# Patient Record
Sex: FEMALE
[De-identification: names, ages, dates, MRNs, and addresses within clinical notes are randomized per-mention and may not be internally consistent; named-entity substitution may affect disease eponyms.]

---

## 2020-02-21 PROBLEM — Z00.00 ENCOUNTER FOR PREVENTIVE HEALTH EXAMINATION: Status: ACTIVE | Noted: 2020-02-21

## 2020-02-24 ENCOUNTER — APPOINTMENT (OUTPATIENT)
Dept: GASTROENTEROLOGY | Facility: CLINIC | Age: 25
End: 2020-02-24
Payer: COMMERCIAL

## 2020-02-24 VITALS
SYSTOLIC BLOOD PRESSURE: 112 MMHG | HEART RATE: 110 BPM | OXYGEN SATURATION: 98 % | DIASTOLIC BLOOD PRESSURE: 68 MMHG | BODY MASS INDEX: 30.3 KG/M2 | WEIGHT: 171 LBS | HEIGHT: 63 IN

## 2020-02-24 DIAGNOSIS — R10.12 LEFT UPPER QUADRANT PAIN: ICD-10-CM

## 2020-02-24 PROCEDURE — 99204 OFFICE O/P NEW MOD 45 MIN: CPT

## 2020-02-24 RX ORDER — ALBUTEROL SULFATE 90 UG/1
108 (90 BASE) AEROSOL, METERED RESPIRATORY (INHALATION)
Refills: 0 | Status: ACTIVE | COMMUNITY
Start: 2020-02-24

## 2020-02-24 RX ORDER — OMEPRAZOLE 20 MG/1
20 CAPSULE, DELAYED RELEASE ORAL DAILY
Qty: 90 | Refills: 1 | Status: ACTIVE | COMMUNITY
Start: 2020-02-24 | End: 1900-01-01

## 2020-02-24 RX ORDER — LEVOTHYROXINE SODIUM 0.03 MG/1
25 TABLET ORAL
Qty: 30 | Refills: 1 | Status: ACTIVE | COMMUNITY
Start: 2020-02-24

## 2020-02-24 RX ORDER — NORETHINDRONE ACETATE AND ETHINYL ESTRADIOL 1; .02 MG/1; MG/1
1-20 TABLET ORAL
Refills: 0 | Status: ACTIVE | COMMUNITY
Start: 2020-02-24

## 2020-02-24 NOTE — PHYSICAL EXAM
[General Appearance - Alert] : alert [General Appearance - In No Acute Distress] : in no acute distress [General Appearance - Well Nourished] : well nourished [General Appearance - Well Developed] : well developed [Sclera] : the sclera and conjunctiva were normal [Outer Ear] : the ears and nose were normal in appearance [Neck Appearance] : the appearance of the neck was normal [] : no respiratory distress [Respiration, Rhythm And Depth] : normal respiratory rhythm and effort [Exaggerated Use Of Accessory Muscles For Inspiration] : no accessory muscle use [Auscultation Breath Sounds / Voice Sounds] : lungs were clear to auscultation bilaterally [Apical Impulse] : the apical impulse was normal [Heart Rate And Rhythm] : heart rate was normal and rhythm regular [Heart Sounds] : normal S1 and S2 [Bowel Sounds] : normal bowel sounds [Abdomen Soft] : soft [Abdomen Tenderness] : non-tender [Skin Color & Pigmentation] : normal skin color and pigmentation [Femoral Lymph Nodes Enlarged Bilaterally] : femoral [Oriented To Time, Place, And Person] : oriented to person, place, and time

## 2020-02-24 NOTE — HISTORY OF PRESENT ILLNESS
[de-identified] :  patient with LUQ pain X 2 years. Pain is moderate in severity. Occurs intermittently, Can occur  from 1-3 day and then not occur for one month. pain lasts hours. Will radiate around to the back.  Pain is a burning pain. Pt is pointing to the left lower rib area.  Sometimes worse with full stomach. No relation to particular foods. Rare mild heartburn. No weight loss. No rectal bleeding, no constipation, no diarrhea.

## 2020-02-24 NOTE — ASSESSMENT
[FreeTextEntry1] : A/P\par LUQ pain- unclear if this is GI related vs costchondritis. Pt is not sure if worse with movement\par will try Prilsoec 20 mg prn\par  I discussed the risks and benefits of EGD and patient was given opportunity to ask questions. EGD to r/o Singletary's  esophagus, PUD, mass, AVM'S. Pt is medically optimized for EGD\par

## 2020-04-29 ENCOUNTER — APPOINTMENT (OUTPATIENT)
Dept: GASTROENTEROLOGY | Facility: GI CENTER | Age: 25
End: 2020-04-29

## 2020-05-14 PROBLEM — R10.12 LEFT UPPER QUADRANT PAIN: Status: ACTIVE | Noted: 2020-02-24

## 2023-03-16 ENCOUNTER — NON-APPOINTMENT (OUTPATIENT)
Age: 28
End: 2023-03-16

## 2023-05-14 ENCOUNTER — NON-APPOINTMENT (OUTPATIENT)
Age: 28
End: 2023-05-14

## 2023-11-03 ENCOUNTER — NON-APPOINTMENT (OUTPATIENT)
Age: 28
End: 2023-11-03

## 2023-11-10 ENCOUNTER — NON-APPOINTMENT (OUTPATIENT)
Age: 28
End: 2023-11-10

## 2024-10-16 ENCOUNTER — EMERGENCY (EMERGENCY)
Facility: HOSPITAL | Age: 29
LOS: 1 days | Discharge: HOME | End: 2024-10-16
Attending: EMERGENCY MEDICINE
Payer: COMMERCIAL

## 2024-10-16 VITALS
DIASTOLIC BLOOD PRESSURE: 88 MMHG | RESPIRATION RATE: 20 BRPM | OXYGEN SATURATION: 99 % | TEMPERATURE: 98 F | HEART RATE: 106 BPM | WEIGHT: 160.06 LBS | SYSTOLIC BLOOD PRESSURE: 128 MMHG

## 2024-10-16 PROCEDURE — 99283 EMERGENCY DEPT VISIT LOW MDM: CPT

## 2024-10-16 PROCEDURE — 99282 EMERGENCY DEPT VISIT SF MDM: CPT

## 2024-10-16 PROCEDURE — 99053 MED SERV 10PM-8AM 24 HR FAC: CPT

## 2024-10-16 NOTE — ED PROVIDER NOTE - PATIENT PORTAL LINK FT
You can access the FollowMyHealth Patient Portal offered by [facility] by registering at the following website: [email or web address]. By joining [employer]’s FollowMyHealth portal, you will also be able to view your health information using other applications (apps) compatible with our system.

## 2024-10-16 NOTE — ED PROVIDER NOTE - PHYSICAL EXAMINATION
Const: AOX3 nontoxic appearing, no apparent respiratory or physical distress. Stable gait   HEENT: NC/AT. Moist mucous membranes.  Eyes: SENA. EOMI  Neck: Soft and supple. Full ROM without pain.  Cardiac: Regular rate and regular rhythm.   Resp: Speaking in full sentences. No evidence of respiratory distress.   Skin:  left hand left index finger no puncture wound noted no active bleeding no erythema or edema noted- minimal tenderness to palpation   left hand range of motion grossly intact  Neuro: Awake, alert & oriented x 3. Moves all extremities symmetrically.

## 2024-10-16 NOTE — ED PROVIDER NOTE - ATTENDING APP SHARED VISIT CONTRIBUTION OF CARE
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  Using insulin needle. she had gloves on. was bleeding she washed it and clean it.- last tetanus was 2 years ago. source patient still not been tested for HIV      Impression: Occupational Exposure    I, Sachin Chao, performed the initial face to face bedside interview with this patient regarding history of present illness, review of symptoms and relevant past medical, social and family history.  I completed an independent physical examination.  I was the initial provider who evaluated this patient. I have signed out the follow up of any pending tests (i.e. labs, radiological studies) to the ACP.  I have communicated the patient’s plan of care and disposition with the ACP.

## 2024-10-16 NOTE — ED PROVIDER NOTE - OBJECTIVE STATEMENT
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  Using insulin needle. she had gloves on. was bleeding she washed it and clean it.- last tetanus was 2 years ago. 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  she was Using insulin needle. she had gloves on. was bleeding she washed it and clean it.- last tetanus was 2 years ago. source patient still not been tested for HIV.

## 2024-10-16 NOTE — ED PROVIDER NOTE - CLINICAL SUMMARY MEDICAL DECISION MAKING FREE TEXT BOX
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  Using insulin needle. she had gloves on. was bleeding she washed it and clean it.- last tetanus was 2 years ago. source patient still not been tested for HIV     spoke with the patient. patient does not want to get HIV prophylaxis at this point risk. explained to the patient they were tested source patient for HIV and hepatitis

## 2024-10-16 NOTE — ED PROVIDER NOTE - NSFOLLOWUPINSTRUCTIONS_ED_ALL_ED_FT
please follow-up with employee health within 1 week   make sure test the source patient for HIV and hepatitis panels consult the patient   come back in ER if any positive test result for the patient for prophylactic HIV treatment    Needlestick and Sharps Injury       A needlestick injury happens when a person gets poked (stuck) by a needle or sharp tool (sharps) that may have someone else's blood on it. A needlestick injury can happen to a health care worker, or to anyone who is exposed to needles. The injury may expose you to blood that carries infections such as:    Hepatitis B.  Hepatitis C.  Human immunodeficiency virus (HIV).    If you have a needle stick injury or think you may have been exposed to blood or body fluids:    Wash the injured area right away with soap and water.  Place a bandage or clean towel on the wound and apply gentle pressure to stop the bleeding. Do not squeeze or rub the area.  Notify a work place supervisor or doctor. Follow any procedures in your work place.  Tell your doctor if you are pregnant or breastfeeding.    Treatments may include:    Blood tests to make sure that you have no infection.  Tetanus shot.  Hepatitis B shot.  Medicines to stop or treat infection.  Treatment for the wound.    Follow these instructions at home:      Wound care    There are many ways to close and cover a wound. For example, a wound can be covered with sutures, skin glue, or adhesive strips. Follow instructions from your doctor about:    How to take care of your wound.  When and how you should change your bandage (dressing).  Keep the bandage dry as told by your doctor.   Do not take baths, swim, use a hot tub, or do anything that would put your wound underwater until your doctor approves.  Check your wound every day for signs of infection. Check for:    Redness, swelling, or pain.  Fluid or blood.  Pus or a bad smell.  Warmth.        General instructions    Take over-the-counter and prescription medicines only as told by your doctor.  If you were prescribed an antibiotic medicine, take it as told by your doctor. Do not stop using the antibiotic even if you start to feel better.  Keep all follow-up visits as told by your doctor. This is important.    Contact a doctor if:  The poked area is red, swollen, or painful.  You have a fever.  You feel worried (anxious), mad, or sad (depressed).  You have trouble sleeping.  Your skin or the whites of your eyes look yellow (jaundice).  You have belly pain or a feeling of fullness.  You have tiredness (fatigue).  You feel sickness in a lot of your body (malaise).  You get infections often.    Summary  A needlestick injury happens when a person gets poked (stuck) by a needle that may have someone else's blood on it.  It is treated by cleaning the injured area right away with soap and water. You may get tetanus and hepatitis B shots. You may also get medicines for infections.  Take medicine and care for your wound as told by your doctor.    ADDITIONAL NOTES AND INSTRUCTIONS    Please follow up with your Primary MD in 24-48 hr.  Seek immediate medical care for any new/worsening signs or symptoms. please follow-up with employee health within 3 DAYS   make sure test the source patient for HIV and hepatitis panels consult the patient   come back in ER if any positive test result for the patient for prophylactic HIV treatment    Needlestick and Sharps Injury       A needlestick injury happens when a person gets poked (stuck) by a needle or sharp tool (sharps) that may have someone else's blood on it. A needlestick injury can happen to a health care worker, or to anyone who is exposed to needles. The injury may expose you to blood that carries infections such as:    Hepatitis B.  Hepatitis C.  Human immunodeficiency virus (HIV).    If you have a needle stick injury or think you may have been exposed to blood or body fluids:    Wash the injured area right away with soap and water.  Place a bandage or clean towel on the wound and apply gentle pressure to stop the bleeding. Do not squeeze or rub the area.  Notify a work place supervisor or doctor. Follow any procedures in your work place.  Tell your doctor if you are pregnant or breastfeeding.    Treatments may include:    Blood tests to make sure that you have no infection.  Tetanus shot.  Hepatitis B shot.  Medicines to stop or treat infection.  Treatment for the wound.    Follow these instructions at home:      Wound care    There are many ways to close and cover a wound. For example, a wound can be covered with sutures, skin glue, or adhesive strips. Follow instructions from your doctor about:    How to take care of your wound.  When and how you should change your bandage (dressing).  Keep the bandage dry as told by your doctor.   Do not take baths, swim, use a hot tub, or do anything that would put your wound underwater until your doctor approves.  Check your wound every day for signs of infection. Check for:    Redness, swelling, or pain.  Fluid or blood.  Pus or a bad smell.  Warmth.        General instructions    Take over-the-counter and prescription medicines only as told by your doctor.  If you were prescribed an antibiotic medicine, take it as told by your doctor. Do not stop using the antibiotic even if you start to feel better.  Keep all follow-up visits as told by your doctor. This is important.    Contact a doctor if:  The poked area is red, swollen, or painful.  You have a fever.  You feel worried (anxious), mad, or sad (depressed).  You have trouble sleeping.  Your skin or the whites of your eyes look yellow (jaundice).  You have belly pain or a feeling of fullness.  You have tiredness (fatigue).  You feel sickness in a lot of your body (malaise).  You get infections often.    Summary  A needlestick injury happens when a person gets poked (stuck) by a needle that may have someone else's blood on it.  It is treated by cleaning the injured area right away with soap and water. You may get tetanus and hepatitis B shots. You may also get medicines for infections.  Take medicine and care for your wound as told by your doctor.    ADDITIONAL NOTES AND INSTRUCTIONS    Please follow up with your Primary MD in 24-48 hr.  Seek immediate medical care for any new/worsening signs or symptoms.

## 2024-10-17 NOTE — ED ADULT NURSE NOTE - NS ED NURSE DISCH DISPOSITION
Cordero Both
Discharged

## 2024-10-17 NOTE — ED ADULT NURSE NOTE - OBJECTIVE STATEMENT
assumed care of pt from triage, pt AAOX3, resp. 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

## 2024-11-25 ENCOUNTER — NON-APPOINTMENT (OUTPATIENT)
Age: 29
End: 2024-11-25

## 2024-11-25 ENCOUNTER — APPOINTMENT (OUTPATIENT)
Dept: OBGYN | Facility: CLINIC | Age: 29
End: 2024-11-25
Payer: COMMERCIAL

## 2024-11-25 VITALS
SYSTOLIC BLOOD PRESSURE: 128 MMHG | DIASTOLIC BLOOD PRESSURE: 90 MMHG | WEIGHT: 186 LBS | BODY MASS INDEX: 32.96 KG/M2 | HEIGHT: 63 IN

## 2024-11-25 DIAGNOSIS — Z01.419 ENCOUNTER FOR GYNECOLOGICAL EXAMINATION (GENERAL) (ROUTINE) W/OUT ABNORMAL FINDINGS: ICD-10-CM

## 2024-11-25 DIAGNOSIS — Z86.39 PERSONAL HISTORY OF OTHER ENDOCRINE, NUTRITIONAL AND METABOLIC DISEASE: ICD-10-CM

## 2024-11-25 DIAGNOSIS — J45.50 SEVERE PERSISTENT ASTHMA, UNCOMPLICATED: ICD-10-CM

## 2024-11-25 DIAGNOSIS — Z78.9 OTHER SPECIFIED HEALTH STATUS: ICD-10-CM

## 2024-11-25 DIAGNOSIS — Z83.3 FAMILY HISTORY OF DIABETES MELLITUS: ICD-10-CM

## 2024-11-25 DIAGNOSIS — Z30.09 ENCOUNTER FOR OTHER GENERAL COUNSELING AND ADVICE ON CONTRACEPTION: ICD-10-CM

## 2024-11-25 DIAGNOSIS — Z12.4 ENCOUNTER FOR SCREENING FOR MALIGNANT NEOPLASM OF CERVIX: ICD-10-CM

## 2024-11-25 LAB
HCG UR QL: NEGATIVE
QUALITY CONTROL: YES

## 2024-11-25 PROCEDURE — 99385 PREV VISIT NEW AGE 18-39: CPT

## 2024-11-25 RX ORDER — NORETHINDRONE ACETATE AND ETHINYL ESTRADIOL AND FERROUS FUMARATE 1MG-20(24)
1-20 KIT ORAL
Refills: 0 | Status: ACTIVE | COMMUNITY

## 2024-11-26 LAB
C TRACH RRNA SPEC QL NAA+PROBE: NOT DETECTED
N GONORRHOEA RRNA SPEC QL NAA+PROBE: NOT DETECTED
SOURCE TP AMPLIFICATION: NORMAL

## 2024-12-05 LAB — CYTOLOGY CVX/VAG DOC THIN PREP: NORMAL
